# Patient Record
Sex: FEMALE | Employment: PART TIME | URBAN - METROPOLITAN AREA
[De-identification: names, ages, dates, MRNs, and addresses within clinical notes are randomized per-mention and may not be internally consistent; named-entity substitution may affect disease eponyms.]

---

## 2021-01-06 ENCOUNTER — OFFICE VISIT (OUTPATIENT)
Dept: OBSTETRICS AND GYNECOLOGY | Facility: CLINIC | Age: 26
End: 2021-01-06

## 2021-01-06 VITALS
DIASTOLIC BLOOD PRESSURE: 66 MMHG | HEIGHT: 69 IN | BODY MASS INDEX: 24.44 KG/M2 | SYSTOLIC BLOOD PRESSURE: 102 MMHG | WEIGHT: 165 LBS | TEMPERATURE: 97.7 F

## 2021-01-06 DIAGNOSIS — R10.2 PELVIC PAIN: Primary | ICD-10-CM

## 2021-01-06 PROCEDURE — 99203 OFFICE O/P NEW LOW 30 MIN: CPT | Performed by: OBSTETRICS & GYNECOLOGY

## 2021-01-06 RX ORDER — ARIPIPRAZOLE 1 MG/ML
10 SOLUTION ORAL DAILY
COMMUNITY

## 2021-01-06 RX ORDER — PHENAZOPYRIDINE HYDROCHLORIDE 200 MG/1
200 TABLET, FILM COATED ORAL 3 TIMES DAILY PRN
Qty: 9 TABLET | Refills: 3 | Status: SHIPPED | OUTPATIENT
Start: 2021-01-06

## 2021-01-06 RX ORDER — ESCITALOPRAM OXALATE 20 MG/1
20 TABLET ORAL DAILY
COMMUNITY

## 2021-01-06 RX ORDER — CIPROFLOXACIN 500 MG/1
500 TABLET, FILM COATED ORAL 2 TIMES DAILY
COMMUNITY

## 2021-01-06 NOTE — PROGRESS NOTES
CC:  Pain  HPI:  This patient is a 25-year-old nulliparous white female who presents for the evaluation of pelvic pain.  The pain began approximately 1 week ago and presented primarily as severe dysuria.  She was evaluated in [facility] on a couple of occasions via urgent care treatment centers and was given 2 separate prescriptions for Keflex and eventually given a prescription for Cipro.  She is on day 4 of Cipro.  She had a CAT scan performed which was normal.  The patient has a history of frequent urinary tract infections.  In response to the Cipro her primary symptom of dysuria has dramatically improved she continues to have some intermittent midline crampy pain which is occasionally during the day lasting a matter of minutes.  She has no other urinary tract symptoms.  She reports a normal voiding pattern normal voiding frequency she is able to completely void at least to her sensation.  The patient is sexually active.  She uses condoms for birth control.  She was on the oral contraceptive pill for many years and stopped within the last year she currently has a menstrual cycle every 2 months lasting 5 days with normal flow and mild dysmenorrhea.  She is sexually active and has no pain or problems with intercourse.  She has had 4 sexual partners over the last year.  She had a negative gonorrhea and Chlamydia culture as part of her evaluation and [facility] within the last week.  An ultrasound was performed in the office which revealed a normal uterus normal right adnexa the left ovary was not visualized.  There is no fluid in the cul-de-sac.  Her bladder appeared full.  Review of systems  14 point reviewed otherwise negative  Vital signs: Reviewed  Physical exam:  Not performed  Impression:  This patient appears to have a resolving lower urinary tract infection in the context of frequent urinary tract infections.  It does not seem as though there is a gynecologic source of her pain.  This is  an acute problem.  There are no signs of acute gynecologic problems on her ultrasound.  She has a normal STD screen.  Plan:  I have suggested that she complete her course of Cipro  She can report back should she not have complete resolution.  I will prescribe Pyridium for her for symptomatic relief in the meantime.  I have suggested further a more complete urologic evaluation in the future.  This was a 35minute patient encounter face-to-face with the entire time devoted to the evaluation and management of pelvic pain and urinary tract infection